# Patient Record
Sex: MALE
[De-identification: names, ages, dates, MRNs, and addresses within clinical notes are randomized per-mention and may not be internally consistent; named-entity substitution may affect disease eponyms.]

---

## 2020-04-26 ENCOUNTER — MESSAGE (OUTPATIENT)
Age: 45
End: 2020-04-26

## 2020-05-19 LAB
SARS-COV-2 IGG SERPL IA-ACNC: 0.1 INDEX
SARS-COV-2 IGG SERPL QL IA: NEGATIVE

## 2022-01-06 PROBLEM — Z00.00 ENCOUNTER FOR PREVENTIVE HEALTH EXAMINATION: Status: ACTIVE | Noted: 2022-01-06

## 2022-01-07 ENCOUNTER — APPOINTMENT (OUTPATIENT)
Dept: ORTHOPEDIC SURGERY | Facility: CLINIC | Age: 47
End: 2022-01-07
Payer: SELF-PAY

## 2022-01-07 VITALS — WEIGHT: 200 LBS | HEIGHT: 74 IN | BODY MASS INDEX: 25.67 KG/M2

## 2022-01-07 DIAGNOSIS — M21.862 OTHER SPECIFIED ACQUIRED DEFORMITIES OF LEFT LOWER LEG: ICD-10-CM

## 2022-01-07 DIAGNOSIS — Z78.9 OTHER SPECIFIED HEALTH STATUS: ICD-10-CM

## 2022-01-07 DIAGNOSIS — F17.200 NICOTINE DEPENDENCE, UNSPECIFIED, UNCOMPLICATED: ICD-10-CM

## 2022-01-07 DIAGNOSIS — S80.02XA CONTUSION OF LEFT KNEE, INITIAL ENCOUNTER: ICD-10-CM

## 2022-01-07 PROCEDURE — 99203 OFFICE O/P NEW LOW 30 MIN: CPT

## 2022-01-07 PROCEDURE — 73564 X-RAY EXAM KNEE 4 OR MORE: CPT | Mod: LT

## 2022-01-07 NOTE — ASSESSMENT
[FreeTextEntry1] : 46-year-old with left knee pain following a contusion that occurred about 4 days ago.  He has hyperextension/recurvatum in his left knee significantly greater than his right knee which may be acute or chronic.  It could indicate a ligament tear and I referred him for an MRI to evaluate further given the side to side difference in his knees without any clear explanation except this recent trauma.\par I will call him with the results.\par In the meantime he was given home exercises to do some leg lifts and strengthening.  Ice as needed.  Relative rest.  Avoid painful activities.  I will call him with the results of the MRI and I will see him back in about 2 to 3 weeks.

## 2022-01-07 NOTE — PHYSICAL EXAM
[LE] : Sensory: Intact in bilateral lower extremities [Normal RLE] : Right Lower Extremity: No scars, rashes, lesions, ulcers, skin intact [Normal LLE] : Left Lower Extremity: No scars, rashes, lesions, ulcers, skin intact [Normal Touch] : sensation intact for touch [Normal] : Oriented to person, place, and time, insight and judgement were intact and the affect was normal [de-identified] : Left knee\par Minimally antalgic gait.\par There is a superficial abrasion over the anterior patella that is clean.  Antibiotic ointment and a new Band-Aid were applied.\par Tender mildly over the anterior patella.  Nontender medial and lateral joint line.\par Knee range of motion is with significant hyperextension of about 10+ degrees on the left versus about 6 degrees on the right.  Flexion is to 130 to 135 degrees with pain on the left and no pain on the right with flexion to 135 degrees.\par No effusion.\par Ia Lachman.  Negative anterior and posterior drawer.  Normal varus and valgus and rotational laxity.\par Minimal pain with Ilya.  Normal patella medial and lateral pressure. [de-identified] : \par Left knee x-rays AP, lateral, merchant and 45 degree PA flexed views today show no joint space narrowing.  No fractures.  No significant osteoarthritis.  Unremarkable

## 2022-01-07 NOTE — HISTORY OF PRESENT ILLNESS
[de-identified] : Amor is a 45 yo who presents for evaluation of his knee because he slipped on black ice and fell on January 5, 2022.  He fell directly on his knee.  No prior knee issues.  He scraped his knee and is applying a Band-Aid.  He just has some anterior knee pain walking and with moving.  No prior knee issues.  He has pain that is 2 out of 10 and intermittent and dull better with rest and worse walking upstairs.

## 2022-01-27 NOTE — HISTORY OF PRESENT ILLNESS
[de-identified] : patient is a 47 y/o male who is here for a follow up for his knee. He fell in early January. He had xrays last visit which did not show any significant arthritis or fracture. He was referred for MRI but did not go.

## 2022-01-27 NOTE — PHYSICAL EXAM
[LE] : 5/5 motor strength in bilateral lower extremities [Normal LUE] : Left Upper Extremity: No scars, rashes, lesions, ulcers, skin intact [Normal Touch] : sensation intact for touch [Normal] : Oriented to person, place, and time, insight and judgement were intact and the affect was normal

## 2022-01-28 ENCOUNTER — APPOINTMENT (OUTPATIENT)
Dept: ORTHOPEDIC SURGERY | Facility: CLINIC | Age: 47
End: 2022-01-28

## 2025-03-19 ENCOUNTER — APPOINTMENT (OUTPATIENT)
Dept: INTERNAL MEDICINE | Facility: CLINIC | Age: 50
End: 2025-03-19
Payer: COMMERCIAL

## 2025-03-19 VITALS
OXYGEN SATURATION: 98 % | WEIGHT: 218 LBS | TEMPERATURE: 98 F | RESPIRATION RATE: 16 BRPM | HEART RATE: 84 BPM | BODY MASS INDEX: 27.98 KG/M2 | DIASTOLIC BLOOD PRESSURE: 74 MMHG | HEIGHT: 74 IN | SYSTOLIC BLOOD PRESSURE: 120 MMHG

## 2025-03-19 DIAGNOSIS — Z72.0 TOBACCO USE: ICD-10-CM

## 2025-03-19 DIAGNOSIS — Z00.00 ENCOUNTER FOR GENERAL ADULT MEDICAL EXAMINATION W/OUT ABNORMAL FINDINGS: ICD-10-CM

## 2025-03-19 DIAGNOSIS — Z12.11 ENCOUNTER FOR SCREENING FOR MALIGNANT NEOPLASM OF COLON: ICD-10-CM

## 2025-03-19 PROCEDURE — 99386 PREV VISIT NEW AGE 40-64: CPT

## 2025-03-19 PROCEDURE — 99407 BEHAV CHNG SMOKING > 10 MIN: CPT

## 2025-03-25 LAB
ALBUMIN SERPL ELPH-MCNC: 4.1 G/DL
ALP BLD-CCNC: 114 U/L
ALT SERPL-CCNC: 34 U/L
ANION GAP SERPL CALC-SCNC: 12 MMOL/L
AST SERPL-CCNC: 23 U/L
BASOPHILS # BLD AUTO: 0.03 K/UL
BASOPHILS NFR BLD AUTO: 0.5 %
BILIRUB SERPL-MCNC: 0.4 MG/DL
BUN SERPL-MCNC: 8 MG/DL
CALCIUM SERPL-MCNC: 9.5 MG/DL
CHLORIDE SERPL-SCNC: 106 MMOL/L
CHOLEST SERPL-MCNC: 251 MG/DL
CO2 SERPL-SCNC: 23 MMOL/L
CREAT SERPL-MCNC: 0.83 MG/DL
EGFRCR SERPLBLD CKD-EPI 2021: 108 ML/MIN/1.73M2
EOSINOPHIL # BLD AUTO: 0.14 K/UL
EOSINOPHIL NFR BLD AUTO: 2.2 %
GLUCOSE SERPL-MCNC: 115 MG/DL
HCT VFR BLD CALC: 46.6 %
HCV AB SER QL: NONREACTIVE
HCV S/CO RATIO: 0.07 S/CO
HDLC SERPL-MCNC: 48 MG/DL
HGB BLD-MCNC: 16.2 G/DL
IMM GRANULOCYTES NFR BLD AUTO: 0.2 %
LDLC SERPL-MCNC: 189 MG/DL
LYMPHOCYTES # BLD AUTO: 3.1 K/UL
LYMPHOCYTES NFR BLD AUTO: 49 %
MAN DIFF?: NORMAL
MCHC RBC-ENTMCNC: 29.9 PG
MCHC RBC-ENTMCNC: 34.8 G/DL
MCV RBC AUTO: 86.1 FL
MONOCYTES # BLD AUTO: 0.47 K/UL
MONOCYTES NFR BLD AUTO: 7.4 %
NEUTROPHILS # BLD AUTO: 2.58 K/UL
NEUTROPHILS NFR BLD AUTO: 40.7 %
NONHDLC SERPL-MCNC: 203 MG/DL
PLATELET # BLD AUTO: 233 K/UL
POTASSIUM SERPL-SCNC: 4.3 MMOL/L
PROT SERPL-MCNC: 6.3 G/DL
RBC # BLD: 5.41 M/UL
RBC # FLD: 12.9 %
SODIUM SERPL-SCNC: 141 MMOL/L
TRIGL SERPL-MCNC: 82 MG/DL
WBC # FLD AUTO: 6.33 K/UL

## 2025-03-27 LAB — LIVER FIBROSIS SCORE IN SERUM BY CALCULATED BY ELF: NORMAL

## 2025-04-22 ENCOUNTER — APPOINTMENT (OUTPATIENT)
Dept: INTERNAL MEDICINE | Facility: CLINIC | Age: 50
End: 2025-04-22
Payer: COMMERCIAL

## 2025-04-22 ENCOUNTER — TRANSCRIPTION ENCOUNTER (OUTPATIENT)
Age: 50
End: 2025-04-22

## 2025-04-22 ENCOUNTER — OUTPATIENT (OUTPATIENT)
Dept: OUTPATIENT SERVICES | Facility: HOSPITAL | Age: 50
LOS: 1 days | End: 2025-04-22
Payer: COMMERCIAL

## 2025-04-22 VITALS
OXYGEN SATURATION: 98 % | HEART RATE: 83 BPM | BODY MASS INDEX: 29 KG/M2 | RESPIRATION RATE: 18 BRPM | DIASTOLIC BLOOD PRESSURE: 73 MMHG | HEIGHT: 74 IN | SYSTOLIC BLOOD PRESSURE: 110 MMHG | TEMPERATURE: 97 F | WEIGHT: 226 LBS

## 2025-04-22 DIAGNOSIS — Z00.00 ENCOUNTER FOR GENERAL ADULT MEDICAL EXAMINATION WITHOUT ABNORMAL FINDINGS: ICD-10-CM

## 2025-04-22 DIAGNOSIS — E78.5 HYPERLIPIDEMIA, UNSPECIFIED: ICD-10-CM

## 2025-04-22 PROCEDURE — 99214 OFFICE O/P EST MOD 30 MIN: CPT | Mod: GC

## 2025-04-22 PROCEDURE — G0463: CPT

## 2025-04-22 PROCEDURE — G2211 COMPLEX E/M VISIT ADD ON: CPT | Mod: GC

## 2025-04-22 RX ORDER — ROSUVASTATIN CALCIUM 10 MG/1
10 TABLET, FILM COATED ORAL
Qty: 90 | Refills: 3 | Status: ACTIVE | COMMUNITY
Start: 2025-04-22 | End: 1900-01-01

## 2025-04-30 DIAGNOSIS — E78.5 HYPERLIPIDEMIA, UNSPECIFIED: ICD-10-CM

## 2025-05-20 ENCOUNTER — TRANSCRIPTION ENCOUNTER (OUTPATIENT)
Age: 50
End: 2025-05-20

## 2025-07-22 ENCOUNTER — OUTPATIENT (OUTPATIENT)
Dept: OUTPATIENT SERVICES | Facility: HOSPITAL | Age: 50
LOS: 1 days | End: 2025-07-22
Payer: COMMERCIAL

## 2025-07-22 ENCOUNTER — APPOINTMENT (OUTPATIENT)
Dept: INTERNAL MEDICINE | Facility: CLINIC | Age: 50
End: 2025-07-22
Payer: COMMERCIAL

## 2025-07-22 VITALS
TEMPERATURE: 97.5 F | OXYGEN SATURATION: 98 % | HEIGHT: 74 IN | DIASTOLIC BLOOD PRESSURE: 79 MMHG | BODY MASS INDEX: 27.72 KG/M2 | SYSTOLIC BLOOD PRESSURE: 124 MMHG | HEART RATE: 83 BPM | RESPIRATION RATE: 18 BRPM | WEIGHT: 216 LBS

## 2025-07-22 DIAGNOSIS — E78.5 HYPERLIPIDEMIA, UNSPECIFIED: ICD-10-CM

## 2025-07-22 DIAGNOSIS — Z00.00 ENCOUNTER FOR GENERAL ADULT MEDICAL EXAMINATION WITHOUT ABNORMAL FINDINGS: ICD-10-CM

## 2025-07-22 PROCEDURE — 99214 OFFICE O/P EST MOD 30 MIN: CPT

## 2025-07-22 PROCEDURE — G2211 COMPLEX E/M VISIT ADD ON: CPT

## 2025-07-22 PROCEDURE — G0463: CPT

## 2025-07-23 DIAGNOSIS — E78.5 HYPERLIPIDEMIA, UNSPECIFIED: ICD-10-CM

## 2025-07-26 LAB
CHOLEST SERPL-MCNC: 178 MG/DL
HDLC SERPL-MCNC: 46 MG/DL
LDLC SERPL-MCNC: 121 MG/DL
NONHDLC SERPL-MCNC: 132 MG/DL
TRIGL SERPL-MCNC: 58 MG/DL